# Patient Record
Sex: FEMALE | Race: WHITE | URBAN - METROPOLITAN AREA
[De-identification: names, ages, dates, MRNs, and addresses within clinical notes are randomized per-mention and may not be internally consistent; named-entity substitution may affect disease eponyms.]

---

## 2022-06-28 ENCOUNTER — REFRACTIVE (OUTPATIENT)
Dept: URBAN - METROPOLITAN AREA CLINIC 37 | Facility: CLINIC | Age: 47
End: 2022-06-28

## 2022-06-28 DIAGNOSIS — H52.13 MYOPIA, BILATERAL: Primary | ICD-10-CM

## 2022-06-28 ASSESSMENT — VISUAL ACUITY
OS: 20/20
OD: 20/20

## 2022-06-28 ASSESSMENT — KERATOMETRY
OD: 44.90
OS: 45.65

## 2022-06-28 ASSESSMENT — INTRAOCULAR PRESSURE
OS: 16
OD: 16

## 2022-08-08 ENCOUNTER — OFFICE VISIT (OUTPATIENT)
Dept: URBAN - METROPOLITAN AREA CLINIC 44 | Facility: CLINIC | Age: 47
End: 2022-08-08

## 2022-08-08 ENCOUNTER — OFFICE VISIT (OUTPATIENT)
Dept: URBAN - METROPOLITAN AREA CLINIC 44 | Facility: CLINIC | Age: 47
End: 2022-08-08
Payer: COMMERCIAL

## 2022-08-08 DIAGNOSIS — H52.13 MYOPIA, BILATERAL: Primary | ICD-10-CM

## 2022-08-08 DIAGNOSIS — Z01.818 ENCOUNTER FOR OTHER PREPROCEDURAL EXAMINATION: Primary | ICD-10-CM

## 2022-08-08 DIAGNOSIS — H43.393 OTHER VITREOUS OPACITIES, BILATERAL: ICD-10-CM

## 2022-08-08 PROCEDURE — 99203 OFFICE O/P NEW LOW 30 MIN: CPT | Performed by: REGISTERED NURSE

## 2022-08-08 ASSESSMENT — INTRAOCULAR PRESSURE
OD: 14
OS: 15
OD: 14
OS: 15
OS: 15
OD: 14

## 2022-08-08 ASSESSMENT — PACHYMETRY
OD: 23.50
OS: 3.46
OD: 3.47
OS: 23.54

## 2022-08-31 ENCOUNTER — SURGERY (OUTPATIENT)
Dept: URBAN - METROPOLITAN AREA SURGERY 19 | Facility: SURGERY | Age: 47
End: 2022-08-31

## 2022-09-01 ENCOUNTER — POST-OPERATIVE VISIT (OUTPATIENT)
Dept: URBAN - METROPOLITAN AREA CLINIC 37 | Facility: CLINIC | Age: 47
End: 2022-09-01

## 2022-09-01 DIAGNOSIS — Z48.810 ENCOUNTER FOR SURGICAL AFTERCARE FOLLOWING SURGERY ON A SENSE ORGAN: Primary | ICD-10-CM

## 2022-09-01 PROCEDURE — 99024 POSTOP FOLLOW-UP VISIT: CPT | Performed by: OPTOMETRIST

## 2022-09-01 ASSESSMENT — INTRAOCULAR PRESSURE
OS: 13
OD: 12

## 2022-09-01 NOTE — IMPRESSION/PLAN
Impression:  Encounter for surgical aftercare following surgery on a sense organ  [id number].  Plan: rtc 1 week

## 2022-09-08 ENCOUNTER — POST-OPERATIVE VISIT (OUTPATIENT)
Dept: URBAN - METROPOLITAN AREA CLINIC 37 | Facility: CLINIC | Age: 47
End: 2022-09-08

## 2022-09-08 DIAGNOSIS — Z48.810 ENCOUNTER FOR SURGICAL AFTERCARE FOLLOWING SURGERY ON A SENSE ORGAN: Primary | ICD-10-CM

## 2022-09-08 PROCEDURE — 99024 POSTOP FOLLOW-UP VISIT: CPT | Performed by: OPTOMETRIST

## 2022-09-08 ASSESSMENT — INTRAOCULAR PRESSURE
OS: 16
OD: 14

## 2022-09-08 ASSESSMENT — VISUAL ACUITY
OS: 20/20
OD: 20/20

## 2022-09-08 NOTE — IMPRESSION/PLAN
Impression:  Encounter for surgical aftercare following surgery on a sense organ  [id number]. Plan: rtc 3 week.   use AFTs

## 2022-11-28 ENCOUNTER — POST-OPERATIVE VISIT (OUTPATIENT)
Dept: URBAN - METROPOLITAN AREA CLINIC 37 | Facility: CLINIC | Age: 47
End: 2022-11-28

## 2022-11-28 DIAGNOSIS — Z48.810 ENCOUNTER FOR SURGICAL AFTERCARE FOLLOWING SURGERY ON A SENSE ORGAN: Primary | ICD-10-CM

## 2022-11-28 PROCEDURE — 99024 POSTOP FOLLOW-UP VISIT: CPT | Performed by: OPTOMETRIST

## 2022-11-28 ASSESSMENT — INTRAOCULAR PRESSURE
OS: 18
OD: 18

## 2022-11-28 NOTE — IMPRESSION/PLAN
Impression: S/P Refractive Lensectomy; ORA; LRI (Limbal Relaxing Incision) OU - 89 Days. Encounter for surgical aftercare following surgery on a sense organ  Z48.810.  Plan: rtc 1 year for CE